# Patient Record
Sex: FEMALE | Race: BLACK OR AFRICAN AMERICAN | NOT HISPANIC OR LATINO | URBAN - METROPOLITAN AREA
[De-identification: names, ages, dates, MRNs, and addresses within clinical notes are randomized per-mention and may not be internally consistent; named-entity substitution may affect disease eponyms.]

---

## 2023-08-30 ENCOUNTER — OUTPATIENT (OUTPATIENT)
Dept: OUTPATIENT SERVICES | Facility: HOSPITAL | Age: 38
LOS: 1 days | End: 2023-08-30
Payer: MEDICAID

## 2023-08-30 DIAGNOSIS — Z3A.39 39 WEEKS GESTATION OF PREGNANCY: ICD-10-CM

## 2023-08-30 DIAGNOSIS — Z01.818 ENCOUNTER FOR OTHER PREPROCEDURAL EXAMINATION: ICD-10-CM

## 2023-08-30 LAB
ANION GAP SERPL CALC-SCNC: 9 MMOL/L — SIGNIFICANT CHANGE UP (ref 5–17)
APTT BLD: 26.2 SEC — SIGNIFICANT CHANGE UP (ref 24.5–35.6)
BLD GP AB SCN SERPL QL: SIGNIFICANT CHANGE UP
BUN SERPL-MCNC: 8 MG/DL — SIGNIFICANT CHANGE UP (ref 7–18)
CALCIUM SERPL-MCNC: 8.6 MG/DL — SIGNIFICANT CHANGE UP (ref 8.4–10.5)
CHLORIDE SERPL-SCNC: 110 MMOL/L — HIGH (ref 96–108)
CO2 SERPL-SCNC: 19 MMOL/L — LOW (ref 22–31)
CREAT SERPL-MCNC: 0.66 MG/DL — SIGNIFICANT CHANGE UP (ref 0.5–1.3)
EGFR: 117 ML/MIN/1.73M2 — SIGNIFICANT CHANGE UP
GLUCOSE SERPL-MCNC: 146 MG/DL — HIGH (ref 70–99)
HCT VFR BLD CALC: 32.1 % — LOW (ref 34.5–45)
HGB BLD-MCNC: 10.4 G/DL — LOW (ref 11.5–15.5)
INR BLD: 0.95 RATIO — SIGNIFICANT CHANGE UP (ref 0.85–1.18)
MCHC RBC-ENTMCNC: 28.4 PG — SIGNIFICANT CHANGE UP (ref 27–34)
MCHC RBC-ENTMCNC: 32.4 GM/DL — SIGNIFICANT CHANGE UP (ref 32–36)
MCV RBC AUTO: 87.7 FL — SIGNIFICANT CHANGE UP (ref 80–100)
NRBC # BLD: 0 /100 WBCS — SIGNIFICANT CHANGE UP (ref 0–0)
PLATELET # BLD AUTO: 308 K/UL — SIGNIFICANT CHANGE UP (ref 150–400)
POTASSIUM SERPL-MCNC: 3.6 MMOL/L — SIGNIFICANT CHANGE UP (ref 3.5–5.3)
POTASSIUM SERPL-SCNC: 3.6 MMOL/L — SIGNIFICANT CHANGE UP (ref 3.5–5.3)
PROTHROM AB SERPL-ACNC: 10.9 SEC — SIGNIFICANT CHANGE UP (ref 9.5–13)
RBC # BLD: 3.66 M/UL — LOW (ref 3.8–5.2)
RBC # FLD: 14.3 % — SIGNIFICANT CHANGE UP (ref 10.3–14.5)
SODIUM SERPL-SCNC: 138 MMOL/L — SIGNIFICANT CHANGE UP (ref 135–145)
T PALLIDUM AB TITR SER: NEGATIVE — SIGNIFICANT CHANGE UP
WBC # BLD: 5.52 K/UL — SIGNIFICANT CHANGE UP (ref 3.8–10.5)
WBC # FLD AUTO: 5.52 K/UL — SIGNIFICANT CHANGE UP (ref 3.8–10.5)

## 2023-09-01 ENCOUNTER — OUTPATIENT (OUTPATIENT)
Dept: OUTPATIENT SERVICES | Facility: HOSPITAL | Age: 38
LOS: 1 days | End: 2023-09-01
Payer: MEDICAID

## 2023-09-01 ENCOUNTER — INPATIENT (INPATIENT)
Facility: HOSPITAL | Age: 38
LOS: 2 days | Discharge: HOME | End: 2023-09-04
Attending: OBSTETRICS & GYNECOLOGY | Admitting: OBSTETRICS & GYNECOLOGY
Payer: COMMERCIAL

## 2023-09-01 VITALS — WEIGHT: 229.94 LBS | HEIGHT: 62 IN

## 2023-09-01 DIAGNOSIS — Z3A.39 39 WEEKS GESTATION OF PREGNANCY: ICD-10-CM

## 2023-09-01 DIAGNOSIS — Z3A.00 WEEKS OF GESTATION OF PREGNANCY NOT SPECIFIED: ICD-10-CM

## 2023-09-01 DIAGNOSIS — O26.899 OTHER SPECIFIED PREGNANCY RELATED CONDITIONS, UNSPECIFIED TRIMESTER: ICD-10-CM

## 2023-09-01 LAB
BASOPHILS # BLD AUTO: 0.04 K/UL — SIGNIFICANT CHANGE UP (ref 0–0.2)
BASOPHILS NFR BLD AUTO: 0.8 % — SIGNIFICANT CHANGE UP (ref 0–2)
BLD GP AB SCN SERPL QL: SIGNIFICANT CHANGE UP
EOSINOPHIL # BLD AUTO: 0.11 K/UL — SIGNIFICANT CHANGE UP (ref 0–0.5)
EOSINOPHIL NFR BLD AUTO: 2.1 % — SIGNIFICANT CHANGE UP (ref 0–6)
HCT VFR BLD CALC: 34.7 % — SIGNIFICANT CHANGE UP (ref 34.5–45)
HGB BLD-MCNC: 11 G/DL — LOW (ref 11.5–15.5)
HIV 1 & 2 AB SERPL IA.RAPID: REACTIVE
IMM GRANULOCYTES NFR BLD AUTO: 1.5 % — HIGH (ref 0–0.9)
LYMPHOCYTES # BLD AUTO: 1.36 K/UL — SIGNIFICANT CHANGE UP (ref 1–3.3)
LYMPHOCYTES # BLD AUTO: 26 % — SIGNIFICANT CHANGE UP (ref 13–44)
MCHC RBC-ENTMCNC: 28.4 PG — SIGNIFICANT CHANGE UP (ref 27–34)
MCHC RBC-ENTMCNC: 31.7 GM/DL — LOW (ref 32–36)
MCV RBC AUTO: 89.7 FL — SIGNIFICANT CHANGE UP (ref 80–100)
MONOCYTES # BLD AUTO: 0.54 K/UL — SIGNIFICANT CHANGE UP (ref 0–0.9)
MONOCYTES NFR BLD AUTO: 10.3 % — SIGNIFICANT CHANGE UP (ref 2–14)
NEUTROPHILS # BLD AUTO: 3.1 K/UL — SIGNIFICANT CHANGE UP (ref 1.8–7.4)
NEUTROPHILS NFR BLD AUTO: 59.3 % — SIGNIFICANT CHANGE UP (ref 43–77)
NRBC # BLD: 0 /100 WBCS — SIGNIFICANT CHANGE UP (ref 0–0)
PLATELET # BLD AUTO: 305 K/UL — SIGNIFICANT CHANGE UP (ref 150–400)
RBC # BLD: 3.87 M/UL — SIGNIFICANT CHANGE UP (ref 3.8–5.2)
RBC # FLD: 14.5 % — SIGNIFICANT CHANGE UP (ref 10.3–14.5)
T PALLIDUM AB TITR SER: NEGATIVE — SIGNIFICANT CHANGE UP
WBC # BLD: 5.23 K/UL — SIGNIFICANT CHANGE UP (ref 3.8–10.5)
WBC # FLD AUTO: 5.23 K/UL — SIGNIFICANT CHANGE UP (ref 3.8–10.5)

## 2023-09-01 PROCEDURE — G0463: CPT

## 2023-09-01 PROCEDURE — 59025 FETAL NON-STRESS TEST: CPT

## 2023-09-01 PROCEDURE — 88307 TISSUE EXAM BY PATHOLOGIST: CPT | Mod: 26

## 2023-09-01 PROCEDURE — 86923 COMPATIBILITY TEST ELECTRIC: CPT

## 2023-09-01 RX ORDER — RALTEGRAVIR 400 MG/1
400 TABLET, FILM COATED ORAL EVERY 12 HOURS
Refills: 0 | Status: DISCONTINUED | OUTPATIENT
Start: 2023-09-01 | End: 2023-09-04

## 2023-09-01 RX ORDER — ONDANSETRON 8 MG/1
4 TABLET, FILM COATED ORAL EVERY 6 HOURS
Refills: 0 | Status: DISCONTINUED | OUTPATIENT
Start: 2023-09-01 | End: 2023-09-04

## 2023-09-01 RX ORDER — OXYCODONE HYDROCHLORIDE 5 MG/1
5 TABLET ORAL
Refills: 0 | Status: COMPLETED | OUTPATIENT
Start: 2023-09-01 | End: 2023-09-08

## 2023-09-01 RX ORDER — MAGNESIUM HYDROXIDE 400 MG/1
30 TABLET, CHEWABLE ORAL
Refills: 0 | Status: DISCONTINUED | OUTPATIENT
Start: 2023-09-01 | End: 2023-09-04

## 2023-09-01 RX ORDER — DIPHENHYDRAMINE HCL 50 MG
25 CAPSULE ORAL EVERY 6 HOURS
Refills: 0 | Status: DISCONTINUED | OUTPATIENT
Start: 2023-09-01 | End: 2023-09-04

## 2023-09-01 RX ORDER — OXYTOCIN 10 UNIT/ML
333.33 VIAL (ML) INJECTION
Qty: 20 | Refills: 0 | Status: DISCONTINUED | OUTPATIENT
Start: 2023-09-01 | End: 2023-09-04

## 2023-09-01 RX ORDER — SODIUM CHLORIDE 9 MG/ML
1000 INJECTION, SOLUTION INTRAVENOUS ONCE
Refills: 0 | Status: DISCONTINUED | OUTPATIENT
Start: 2023-09-01 | End: 2023-09-01

## 2023-09-01 RX ORDER — NALOXONE HYDROCHLORIDE 4 MG/.1ML
0.1 SPRAY NASAL
Refills: 0 | Status: DISCONTINUED | OUTPATIENT
Start: 2023-09-01 | End: 2023-09-04

## 2023-09-01 RX ORDER — TETANUS TOXOID, REDUCED DIPHTHERIA TOXOID AND ACELLULAR PERTUSSIS VACCINE, ADSORBED 5; 2.5; 8; 8; 2.5 [IU]/.5ML; [IU]/.5ML; UG/.5ML; UG/.5ML; UG/.5ML
0.5 SUSPENSION INTRAMUSCULAR ONCE
Refills: 0 | Status: DISCONTINUED | OUTPATIENT
Start: 2023-09-01 | End: 2023-09-04

## 2023-09-01 RX ORDER — METOCLOPRAMIDE HCL 10 MG
10 TABLET ORAL ONCE
Refills: 0 | Status: DISCONTINUED | OUTPATIENT
Start: 2023-09-01 | End: 2023-09-04

## 2023-09-01 RX ORDER — ACETAMINOPHEN 500 MG
975 TABLET ORAL
Refills: 0 | Status: DISCONTINUED | OUTPATIENT
Start: 2023-09-01 | End: 2023-09-04

## 2023-09-01 RX ORDER — HEPARIN SODIUM 5000 [USP'U]/ML
5000 INJECTION INTRAVENOUS; SUBCUTANEOUS EVERY 8 HOURS
Refills: 0 | Status: DISCONTINUED | OUTPATIENT
Start: 2023-09-02 | End: 2023-09-04

## 2023-09-01 RX ORDER — EMTRICITABINE AND TENOFOVIR DISOPROXIL FUMARATE 200; 300 MG/1; MG/1
1 TABLET, FILM COATED ORAL DAILY
Refills: 0 | Status: DISCONTINUED | OUTPATIENT
Start: 2023-09-01 | End: 2023-09-04

## 2023-09-01 RX ORDER — SIMETHICONE 80 MG/1
80 TABLET, CHEWABLE ORAL EVERY 6 HOURS
Refills: 0 | Status: DISCONTINUED | OUTPATIENT
Start: 2023-09-01 | End: 2023-09-04

## 2023-09-01 RX ORDER — FAMOTIDINE 10 MG/ML
20 INJECTION INTRAVENOUS ONCE
Refills: 0 | Status: COMPLETED | OUTPATIENT
Start: 2023-09-01 | End: 2023-09-01

## 2023-09-01 RX ORDER — CEFAZOLIN SODIUM 1 G
2000 VIAL (EA) INJECTION ONCE
Refills: 0 | Status: COMPLETED | OUTPATIENT
Start: 2023-09-01 | End: 2023-09-01

## 2023-09-01 RX ORDER — DEXAMETHASONE 0.5 MG/5ML
4 ELIXIR ORAL EVERY 6 HOURS
Refills: 0 | Status: DISCONTINUED | OUTPATIENT
Start: 2023-09-01 | End: 2023-09-04

## 2023-09-01 RX ORDER — DIPHENHYDRAMINE HCL 50 MG
25 CAPSULE ORAL EVERY 6 HOURS
Refills: 0 | Status: COMPLETED | OUTPATIENT
Start: 2023-09-01 | End: 2024-07-30

## 2023-09-01 RX ORDER — SODIUM CHLORIDE 9 MG/ML
1000 INJECTION, SOLUTION INTRAVENOUS
Refills: 0 | Status: DISCONTINUED | OUTPATIENT
Start: 2023-09-01 | End: 2023-09-01

## 2023-09-01 RX ORDER — SODIUM CHLORIDE 9 MG/ML
1000 INJECTION, SOLUTION INTRAVENOUS
Refills: 0 | Status: DISCONTINUED | OUTPATIENT
Start: 2023-09-01 | End: 2023-09-04

## 2023-09-01 RX ORDER — SIMETHICONE 80 MG/1
80 TABLET, CHEWABLE ORAL EVERY 4 HOURS
Refills: 0 | Status: DISCONTINUED | OUTPATIENT
Start: 2023-09-01 | End: 2023-09-01

## 2023-09-01 RX ORDER — ACETAMINOPHEN 500 MG
1000 TABLET ORAL ONCE
Refills: 0 | Status: DISCONTINUED | OUTPATIENT
Start: 2023-09-01 | End: 2023-09-04

## 2023-09-01 RX ORDER — MORPHINE SULFATE 50 MG/1
0.2 CAPSULE, EXTENDED RELEASE ORAL ONCE
Refills: 0 | Status: DISCONTINUED | OUTPATIENT
Start: 2023-09-01 | End: 2023-09-04

## 2023-09-01 RX ORDER — KETOROLAC TROMETHAMINE 30 MG/ML
30 SYRINGE (ML) INJECTION EVERY 6 HOURS
Refills: 0 | Status: DISCONTINUED | OUTPATIENT
Start: 2023-09-01 | End: 2023-09-03

## 2023-09-01 RX ORDER — CITRIC ACID/SODIUM CITRATE 300-500 MG
30 SOLUTION, ORAL ORAL ONCE
Refills: 0 | Status: COMPLETED | OUTPATIENT
Start: 2023-09-01 | End: 2023-09-01

## 2023-09-01 RX ORDER — IBUPROFEN 200 MG
600 TABLET ORAL EVERY 6 HOURS
Refills: 0 | Status: COMPLETED | OUTPATIENT
Start: 2023-09-01 | End: 2024-07-30

## 2023-09-01 RX ORDER — LANOLIN
1 OINTMENT (GRAM) TOPICAL EVERY 6 HOURS
Refills: 0 | Status: DISCONTINUED | OUTPATIENT
Start: 2023-09-01 | End: 2023-09-04

## 2023-09-01 RX ORDER — FERROUS SULFATE 325(65) MG
325 TABLET ORAL DAILY
Refills: 0 | Status: DISCONTINUED | OUTPATIENT
Start: 2023-09-01 | End: 2023-09-04

## 2023-09-01 RX ADMIN — Medication 30 MILLIGRAM(S): at 18:15

## 2023-09-01 RX ADMIN — RALTEGRAVIR 400 MILLIGRAM(S): 400 TABLET, FILM COATED ORAL at 18:54

## 2023-09-01 RX ADMIN — Medication 30 MILLILITER(S): at 12:29

## 2023-09-01 RX ADMIN — Medication 25 MILLIGRAM(S): at 22:42

## 2023-09-01 RX ADMIN — EMTRICITABINE AND TENOFOVIR DISOPROXIL FUMARATE 1 TABLET(S): 200; 300 TABLET, FILM COATED ORAL at 18:55

## 2023-09-01 RX ADMIN — FAMOTIDINE 20 MILLIGRAM(S): 10 INJECTION INTRAVENOUS at 12:29

## 2023-09-01 RX ADMIN — Medication 30 MILLIGRAM(S): at 17:47

## 2023-09-01 RX ADMIN — Medication 1000 MILLIUNIT(S)/MIN: at 18:56

## 2023-09-01 RX ADMIN — Medication 100 MILLIGRAM(S): at 18:24

## 2023-09-01 NOTE — PATIENT PROFILE OB - VISION (WITH CORRECTIVE LENSES IF THE PATIENT USUALLY WEARS THEM):
What to expect if we're setting up an injection/procedure    - LIGHT Intravenous (IV) sedation is offered for some procedures: you will NOT be put to sleep.  If you plan to have sedation, do not eat or drink anything on the day of your injection.   - Most procedures require having someone drive you.  Please make sure you arrange a  unless told otherwise.   - If you take a blood thinner and you were not instructed whether to continue or hold it, please contact us with any questions.         Epidural Steroid Injection    An epidural steroid injection is a shot of steroid medicine and numbing medicine that is given into the space between the spinal cord and the bones of the back (epidural space). The shot helps relieve pain caused by an irritated or swollen nerve root.  The amount of pain relief you get from the injection depends on what is causing the nerve to be swollen and irritated, and how long your pain lasts. You are more likely to benefit from this injection if your pain is strong and comes on suddenly rather than if you have had long-term (chronic) pain.  Tell a health care provider about:  · Any allergies you have.  · All medicines you are taking, including vitamins, herbs, eye drops, creams, and over-the-counter medicines.  · Any problems you or family members have had with anesthetic medicines.  · Any blood disorders you have.  · Any surgeries you have had.  · Any medical conditions you have.  · Whether you are pregnant or may be pregnant.  What are the risks?  Generally, this is a safe procedure. However, problems may occur, including:  · Headache.  · Bleeding.  · Infection.  · Allergic reaction to medicines.  · Nerve damage.  What happens before the procedure?  Staying hydrated  Follow instructions from your health care provider about hydration, which may include:  · Up to 2 hours before the procedure - you may continue to drink clear liquids, such as water, clear fruit juice, black coffee, and  plain tea.  Eating and drinking restrictions  Follow instructions from your health care provider about eating and drinking, which may include:  · 8 hours before the procedure - stop eating heavy meals or foods, such as meat, fried foods, or fatty foods.  · 6 hours before the procedure - stop eating light meals or foods, such as toast or cereal.  · 6 hours before the procedure - stop drinking milk or drinks that contain milk.  · 2 hours before the procedure - stop drinking clear liquids.  Medicines  · You may be given medicines to lower anxiety.  · Ask your health care provider about:  ? Changing or stopping your regular medicines. This is especially important if you are taking diabetes medicines or blood thinners.  ? Taking medicines such as aspirin and ibuprofen. These medicines can thin your blood. Do not take these medicines unless your health care provider tells you to take them.  ? Taking over-the-counter medicines, vitamins, herbs, and supplements.  General instructions  · Ask your health care provider what steps will be taken to prevent infection.  · Plan to have a responsible adult take you home from the hospital or clinic.  · If you will be going home right after the procedure, plan to have a responsible adult care for you for the time you are told. This is important.  What happens during the procedure?  · An IV will be inserted into one of your veins.  · You will be given one or more of the following:  ? A medicine to help you relax (sedative).  ? A medicine to numb the area (local anesthetic).  · You will be asked to lie on your abdomen or sit.  · The injection site will be cleaned.  · A needle will be inserted through your skin into the epidural space. This may cause you some discomfort. An X-ray machine will be used to guide the needle as close as possible to the affected nerve.  · A steroid medicine and a local anesthetic will be injected into the epidural space.  · The needle and IV will be  removed.  · A bandage (dressing) will be put over the injection site.  The procedure may vary among health care providers and hospitals.  What can I expect after the procedure?  · Your blood pressure, heart rate, breathing rate, and blood oxygen level will be monitored until you leave the hospital or clinic.  · Your arm or leg may feel weak or numb for a few hours.  · The injection site may feel sore.  Follow these instructions at home:  Injection site care  · You may remove the bandage (dressing) after 24 hours.  · Check your injection site every day for signs of infection. Check for:  ? Redness, swelling, or pain.  ? Fluid or blood.  ? Warmth.  ? Pus or a bad smell.  Managing pain, stiffness, and swelling  · For 24 hours after the procedure:  ? Avoid using heat on the injection site.  ? Do not take baths, swim, or use a hot tub until your health care provider approves. Ask your health care provider if you may take showers. You may only be allowed to take sponge baths.  · If directed, put ice on the injection site. To do this:  ? Put ice in a plastic bag.  ? Place a towel between your skin and the bag.  ? Leave the ice on for 20 minutes, 2-3 times a day.    Activity  · If you were given a sedative during the procedure, it can affect you for several hours. Do not drive or operate machinery until your health care provider says that it is safe.  · Return to your normal activities as told by your health care provider. Ask your health care provider what activities are safe for you.  General instructions  · Take over-the-counter and prescription medicines only as told by your health care provider.  · Drink enough fluid to keep your urine pale yellow.  · Keep all follow-up visits as told by your health care provider. This is important.  Contact a health care provider if:  · You have any of these signs of infection:  ? Redness, swelling, or pain around your injection site.  ? Fluid or blood coming from your injection  site.  ? Warmth coming from your injection site.  ? Pus or a bad smell coming from your injection site.  ? A fever.  · You continue to have pain and soreness around the injection site, even after taking over-the-counter pain medicine.  · You have severe, sudden, or lasting nausea or vomiting.  Get help right away if:  · You have severe pain at the injection site that is not relieved by medicines.  · You develop a severe headache or a stiff neck.  · You become sensitive to light.  · You have any new numbness or weakness in your legs or arms.  · You lose control of your bladder or bowel movements.  · You have trouble breathing.  Summary  · An epidural steroid injection is a shot of steroid medicine and numbing medicine that is given into the epidural space.  · The shot helps relieve pain caused by an irritated or swollen nerve root.  · You are more likely to benefit from this injection if your pain is strong and comes on suddenly rather than if you have had chronic pain.  This information is not intended to replace advice given to you by your health care provider. Make sure you discuss any questions you have with your health care provider.  Document Revised: 04/16/2021 Document Reviewed: 06/29/2020  Elsevier Patient Education © 2021 [employer]     Normal vision: sees adequately in most situations; can see medication labels, newsprint

## 2023-09-02 LAB
BASOPHILS # BLD AUTO: 0.03 K/UL — SIGNIFICANT CHANGE UP (ref 0–0.2)
BASOPHILS NFR BLD AUTO: 0.4 % — SIGNIFICANT CHANGE UP (ref 0–2)
EOSINOPHIL # BLD AUTO: 0.1 K/UL — SIGNIFICANT CHANGE UP (ref 0–0.5)
EOSINOPHIL NFR BLD AUTO: 1.2 % — SIGNIFICANT CHANGE UP (ref 0–6)
HCT VFR BLD CALC: 25.4 % — LOW (ref 34.5–45)
HGB BLD-MCNC: 8.3 G/DL — LOW (ref 11.5–15.5)
HIV 1+2 AB+HIV1 P24 AG SERPL QL IA: REACTIVE
HIV-1 VIRAL LOAD RESULT: SIGNIFICANT CHANGE UP
HIV1 RNA # SERPL NAA+PROBE: SIGNIFICANT CHANGE UP COPIES/ML
HIV1 RNA SER-IMP: SIGNIFICANT CHANGE UP
HIV1 RNA SERPL NAA+PROBE-ACNC: SIGNIFICANT CHANGE UP
HIV1 RNA SERPL NAA+PROBE-LOG#: SIGNIFICANT CHANGE UP LG COP/ML
HIV1+2 AB SPEC QL: ABNORMAL
HIV1+2 AB SPEC QL: SIGNIFICANT CHANGE UP
IMM GRANULOCYTES NFR BLD AUTO: 0.7 % — SIGNIFICANT CHANGE UP (ref 0–0.9)
LYMPHOCYTES # BLD AUTO: 0.98 K/UL — LOW (ref 1–3.3)
LYMPHOCYTES # BLD AUTO: 12.1 % — LOW (ref 13–44)
MCHC RBC-ENTMCNC: 28.7 PG — SIGNIFICANT CHANGE UP (ref 27–34)
MCHC RBC-ENTMCNC: 32.7 GM/DL — SIGNIFICANT CHANGE UP (ref 32–36)
MCV RBC AUTO: 87.9 FL — SIGNIFICANT CHANGE UP (ref 80–100)
MONOCYTES # BLD AUTO: 0.91 K/UL — HIGH (ref 0–0.9)
MONOCYTES NFR BLD AUTO: 11.2 % — SIGNIFICANT CHANGE UP (ref 2–14)
NEUTROPHILS # BLD AUTO: 6.03 K/UL — SIGNIFICANT CHANGE UP (ref 1.8–7.4)
NEUTROPHILS NFR BLD AUTO: 74.4 % — SIGNIFICANT CHANGE UP (ref 43–77)
NRBC # BLD: 0 /100 WBCS — SIGNIFICANT CHANGE UP (ref 0–0)
PLATELET # BLD AUTO: 238 K/UL — SIGNIFICANT CHANGE UP (ref 150–400)
RBC # BLD: 2.89 M/UL — LOW (ref 3.8–5.2)
RBC # FLD: 14.1 % — SIGNIFICANT CHANGE UP (ref 10.3–14.5)
WBC # BLD: 8.11 K/UL — SIGNIFICANT CHANGE UP (ref 3.8–10.5)
WBC # FLD AUTO: 8.11 K/UL — SIGNIFICANT CHANGE UP (ref 3.8–10.5)

## 2023-09-02 RX ORDER — IBUPROFEN 200 MG
1 TABLET ORAL
Qty: 28 | Refills: 0
Start: 2023-09-02 | End: 2023-09-08

## 2023-09-02 RX ORDER — EMTRICITABINE AND TENOFOVIR DISOPROXIL FUMARATE 200; 300 MG/1; MG/1
1 TABLET, FILM COATED ORAL
Qty: 0 | Refills: 0 | DISCHARGE
Start: 2023-09-02

## 2023-09-02 RX ORDER — SENNA PLUS 8.6 MG/1
2 TABLET ORAL AT BEDTIME
Refills: 0 | Status: DISCONTINUED | OUTPATIENT
Start: 2023-09-02 | End: 2023-09-04

## 2023-09-02 RX ORDER — FERROUS SULFATE 325(65) MG
1 TABLET ORAL
Qty: 60 | Refills: 0
Start: 2023-09-02 | End: 2023-10-01

## 2023-09-02 RX ORDER — RALTEGRAVIR 400 MG/1
1 TABLET, FILM COATED ORAL
Qty: 0 | Refills: 0 | DISCHARGE
Start: 2023-09-02

## 2023-09-02 RX ORDER — DOCUSATE SODIUM 100 MG
1 CAPSULE ORAL
Qty: 40 | Refills: 0
Start: 2023-09-02 | End: 2023-09-21

## 2023-09-02 RX ORDER — ACETAMINOPHEN 500 MG
2 TABLET ORAL
Qty: 0 | Refills: 0 | DISCHARGE
Start: 2023-09-02

## 2023-09-02 RX ADMIN — SENNA PLUS 2 TABLET(S): 8.6 TABLET ORAL at 21:38

## 2023-09-02 RX ADMIN — Medication 1 TABLET(S): at 13:14

## 2023-09-02 RX ADMIN — HEPARIN SODIUM 5000 UNIT(S): 5000 INJECTION INTRAVENOUS; SUBCUTANEOUS at 03:28

## 2023-09-02 RX ADMIN — RALTEGRAVIR 400 MILLIGRAM(S): 400 TABLET, FILM COATED ORAL at 06:48

## 2023-09-02 RX ADMIN — Medication 975 MILLIGRAM(S): at 10:00

## 2023-09-02 RX ADMIN — Medication 30 MILLIGRAM(S): at 17:54

## 2023-09-02 RX ADMIN — Medication 30 MILLIGRAM(S): at 00:19

## 2023-09-02 RX ADMIN — Medication 30 MILLIGRAM(S): at 23:43

## 2023-09-02 RX ADMIN — Medication 30 MILLIGRAM(S): at 18:10

## 2023-09-02 RX ADMIN — RALTEGRAVIR 400 MILLIGRAM(S): 400 TABLET, FILM COATED ORAL at 17:54

## 2023-09-02 RX ADMIN — HEPARIN SODIUM 5000 UNIT(S): 5000 INJECTION INTRAVENOUS; SUBCUTANEOUS at 13:14

## 2023-09-02 RX ADMIN — Medication 30 MILLIGRAM(S): at 07:15

## 2023-09-02 RX ADMIN — Medication 30 MILLIGRAM(S): at 06:48

## 2023-09-02 RX ADMIN — EMTRICITABINE AND TENOFOVIR DISOPROXIL FUMARATE 1 TABLET(S): 200; 300 TABLET, FILM COATED ORAL at 13:15

## 2023-09-02 RX ADMIN — Medication 975 MILLIGRAM(S): at 22:30

## 2023-09-02 RX ADMIN — Medication 975 MILLIGRAM(S): at 21:37

## 2023-09-02 RX ADMIN — SIMETHICONE 80 MILLIGRAM(S): 80 TABLET, CHEWABLE ORAL at 06:48

## 2023-09-02 RX ADMIN — HEPARIN SODIUM 5000 UNIT(S): 5000 INJECTION INTRAVENOUS; SUBCUTANEOUS at 21:39

## 2023-09-02 RX ADMIN — SODIUM CHLORIDE 125 MILLILITER(S): 9 INJECTION, SOLUTION INTRAVENOUS at 03:33

## 2023-09-02 RX ADMIN — Medication 975 MILLIGRAM(S): at 15:16

## 2023-09-02 RX ADMIN — SIMETHICONE 80 MILLIGRAM(S): 80 TABLET, CHEWABLE ORAL at 23:42

## 2023-09-02 RX ADMIN — SIMETHICONE 80 MILLIGRAM(S): 80 TABLET, CHEWABLE ORAL at 03:06

## 2023-09-02 RX ADMIN — Medication 975 MILLIGRAM(S): at 09:06

## 2023-09-02 RX ADMIN — SIMETHICONE 80 MILLIGRAM(S): 80 TABLET, CHEWABLE ORAL at 13:14

## 2023-09-02 RX ADMIN — Medication 975 MILLIGRAM(S): at 16:15

## 2023-09-02 RX ADMIN — Medication 30 MILLIGRAM(S): at 14:15

## 2023-09-02 RX ADMIN — Medication 30 MILLIGRAM(S): at 13:15

## 2023-09-02 RX ADMIN — Medication 30 MILLIGRAM(S): at 00:45

## 2023-09-02 RX ADMIN — Medication 325 MILLIGRAM(S): at 13:14

## 2023-09-02 RX ADMIN — Medication 30 MILLIGRAM(S): at 22:30

## 2023-09-02 NOTE — DISCHARGE NOTE OB - MEDICATION SUMMARY - MEDICATIONS TO TAKE
I will START or STAY ON the medications listed below when I get home from the hospital:    ibuprofen 600 mg oral tablet  -- 1 tab(s) by mouth every 6 hours as needed for  moderate pain MDD: 7  -- Indication: For pain    Tylenol Extra Strength 500 mg oral tablet  -- 2 tab(s) by mouth every 6 hours as needed for  mild pain alternate with motrin for pain  -- Indication: For mild pain    emtricitabine-tenofovir disoproxil 200 mg-300 mg oral tablet  -- 1 tab(s) by mouth once a day  -- Indication: For meds    raltegravir 400 mg oral tablet  -- 1 tab(s) by mouth every 12 hours  -- Indication: For home meds    Prenatal Multivitamins with Folic Acid 1 mg oral tablet  -- 1 tab(s) by mouth once a day  -- Indication: For vitamins    ferrous sulfate 325 mg (65 mg elemental iron) oral tablet  -- 1 tab(s) by mouth 2 times a day MDD: 2  -- Indication: For anemia    docusate sodium 100 mg oral tablet  -- 1 tab(s) by mouth 2 times a day MDD: 2  -- Indication: For stool softener

## 2023-09-02 NOTE — DISCHARGE NOTE OB - PATIENT PORTAL LINK FT
You can access the FollowMyHealth Patient Portal offered by [facility] by registering at the following website: [email or web address]. By joining [facility]’s FollowMyHealth portal, you will also be able to view your health information using other applications (apps) compatible with our system.

## 2023-09-02 NOTE — DISCHARGE NOTE OB - NS MD DC FALL RISK RISK
For information on Fall & Injury Prevention, visit: [email or web address] OR  [email or web address] OR  https://www.cdc.gov/steadi/patient.html

## 2023-09-02 NOTE — DISCHARGE NOTE OB - CARE PROVIDER_API CALL
Ally Smith  Obstetrics and Gynecology  [address], [address]  [place] [address]  Phone: [phone]  Fax: [phone]  Established Patient  Follow Up Time: 2 weeks

## 2023-09-02 NOTE — DISCHARGE NOTE OB - PLAN OF CARE
1. Return to your regular way of eating. Resume normal activity as tolerated, however No heavy lifting or strenuous activity for 6 weeks.  No driving for next 2 weeks and/or while on narcotic pain medication.  Complete vaginal rest, no tampons, no douching, no tub bathing, no emerging in swimming pools, no sexual activities for 6 weeks unless otherwise instructed by your doctor. Call your doctor with any signs and symptoms of infection such as fever, chills, nausea or vomiting.  Call your doctor with redness or swelling at the incision site, fluid leakage or wound separation.  Call your doctor if you're unable to tolerate food or have difficulty urinating.  Call your doctor if you have pain that is not relieved by your prescribed medications.  Notify your doctor with any other concerns.  2. Please take Ibuprofen for moderate pain management. 600mg every 6 hours, as needed.  3. Please take Tylenol for mild pain management 500mg every 6 hours, as needed may take 2 tablets , max daily 4000mg only.   4. Continue prenatal vitamins for 6 weeks or throughout breastfeeding.   5. Follow-up in office in 2 weeks for wound check take iron, folic acid, vitamin C, and prenatal vitamins. eat iron fortified food continue antiviral medications and follow up with PCP/ID doctor

## 2023-09-02 NOTE — DISCHARGE NOTE OB - CARE PLAN
Assessment and plan of treatment:	1. Return to your regular way of eating. Resume normal activity as tolerated, however No heavy lifting or strenuous activity for 6 weeks.  No driving for next 2 weeks and/or while on narcotic pain medication.  Complete vaginal rest, no tampons, no douching, no tub bathing, no emerging in swimming pools, no sexual activities for 6 weeks unless otherwise instructed by your doctor. Call your doctor with any signs and symptoms of infection such as fever, chills, nausea or vomiting.  Call your doctor with redness or swelling at the incision site, fluid leakage or wound separation.  Call your doctor if you're unable to tolerate food or have difficulty urinating.  Call your doctor if you have pain that is not relieved by your prescribed medications.  Notify your doctor with any other concerns.  2. Please take Ibuprofen for moderate pain management. 600mg every 6 hours, as needed.  3. Please take Tylenol for mild pain management 500mg every 6 hours, as needed may take 2 tablets , max daily 4000mg only.   4. Continue prenatal vitamins for 6 weeks or throughout breastfeeding.   5. Follow-up in office in 2 weeks for wound check   Principal Discharge DX:	 delivery delivered  Assessment and plan of treatment:	1. Return to your regular way of eating. Resume normal activity as tolerated, however No heavy lifting or strenuous activity for 6 weeks.  No driving for next 2 weeks and/or while on narcotic pain medication.  Complete vaginal rest, no tampons, no douching, no tub bathing, no emerging in swimming pools, no sexual activities for 6 weeks unless otherwise instructed by your doctor. Call your doctor with any signs and symptoms of infection such as fever, chills, nausea or vomiting.  Call your doctor with redness or swelling at the incision site, fluid leakage or wound separation.  Call your doctor if you're unable to tolerate food or have difficulty urinating.  Call your doctor if you have pain that is not relieved by your prescribed medications.  Notify your doctor with any other concerns.  2. Please take Ibuprofen for moderate pain management. 600mg every 6 hours, as needed.  3. Please take Tylenol for mild pain management 500mg every 6 hours, as needed may take 2 tablets , max daily 4000mg only.   4. Continue prenatal vitamins for 6 weeks or throughout breastfeeding.   5. Follow-up in office in 2 weeks for wound check  Secondary Diagnosis:	Postoperative anemia due to acute blood loss  Assessment and plan of treatment:	take iron, folic acid, vitamin C, and prenatal vitamins. eat iron fortified food  Secondary Diagnosis:	HIV disease   1 Principal Discharge DX:	 delivery delivered  Assessment and plan of treatment:	1. Return to your regular way of eating. Resume normal activity as tolerated, however No heavy lifting or strenuous activity for 6 weeks.  No driving for next 2 weeks and/or while on narcotic pain medication.  Complete vaginal rest, no tampons, no douching, no tub bathing, no emerging in swimming pools, no sexual activities for 6 weeks unless otherwise instructed by your doctor. Call your doctor with any signs and symptoms of infection such as fever, chills, nausea or vomiting.  Call your doctor with redness or swelling at the incision site, fluid leakage or wound separation.  Call your doctor if you're unable to tolerate food or have difficulty urinating.  Call your doctor if you have pain that is not relieved by your prescribed medications.  Notify your doctor with any other concerns.  2. Please take Ibuprofen for moderate pain management. 600mg every 6 hours, as needed.  3. Please take Tylenol for mild pain management 500mg every 6 hours, as needed may take 2 tablets , max daily 4000mg only.   4. Continue prenatal vitamins for 6 weeks or throughout breastfeeding.   5. Follow-up in office in 2 weeks for wound check  Secondary Diagnosis:	Postoperative anemia due to acute blood loss  Assessment and plan of treatment:	take iron, folic acid, vitamin C, and prenatal vitamins. eat iron fortified food  Secondary Diagnosis:	HIV disease  Assessment and plan of treatment:	continue antiviral medications and follow up with PCP/ID doctor

## 2023-09-02 NOTE — DISCHARGE NOTE OB - HOSPITAL COURSE
Pt admitted for Elective primary  due to hx/o POS HIV with undetectable viral load.   Pt had routine post-op care and was dis charged to home. Pt admitted for Elective primary  due to hx/o POS HIV with undetectable viral load.   Pt had routine post-op care and was discharged to home.

## 2023-09-03 DIAGNOSIS — D62 ACUTE POSTHEMORRHAGIC ANEMIA: ICD-10-CM

## 2023-09-03 DIAGNOSIS — B20 HUMAN IMMUNODEFICIENCY VIRUS [HIV] DISEASE: ICD-10-CM

## 2023-09-03 LAB
BASOPHILS # BLD AUTO: 0.05 K/UL — SIGNIFICANT CHANGE UP (ref 0–0.2)
BASOPHILS NFR BLD AUTO: 0.5 % — SIGNIFICANT CHANGE UP (ref 0–2)
EOSINOPHIL # BLD AUTO: 0.22 K/UL — SIGNIFICANT CHANGE UP (ref 0–0.5)
EOSINOPHIL NFR BLD AUTO: 2.3 % — SIGNIFICANT CHANGE UP (ref 0–6)
HCT VFR BLD CALC: 23.1 % — LOW (ref 34.5–45)
HGB BLD-MCNC: 7.5 G/DL — LOW (ref 11.5–15.5)
IMM GRANULOCYTES NFR BLD AUTO: 0.6 % — SIGNIFICANT CHANGE UP (ref 0–0.9)
LYMPHOCYTES # BLD AUTO: 1.75 K/UL — SIGNIFICANT CHANGE UP (ref 1–3.3)
LYMPHOCYTES # BLD AUTO: 18.4 % — SIGNIFICANT CHANGE UP (ref 13–44)
MCHC RBC-ENTMCNC: 28.6 PG — SIGNIFICANT CHANGE UP (ref 27–34)
MCHC RBC-ENTMCNC: 32.5 GM/DL — SIGNIFICANT CHANGE UP (ref 32–36)
MCV RBC AUTO: 88.2 FL — SIGNIFICANT CHANGE UP (ref 80–100)
MONOCYTES # BLD AUTO: 1.14 K/UL — HIGH (ref 0–0.9)
MONOCYTES NFR BLD AUTO: 12 % — SIGNIFICANT CHANGE UP (ref 2–14)
NEUTROPHILS # BLD AUTO: 6.28 K/UL — SIGNIFICANT CHANGE UP (ref 1.8–7.4)
NEUTROPHILS NFR BLD AUTO: 66.2 % — SIGNIFICANT CHANGE UP (ref 43–77)
NRBC # BLD: 0 /100 WBCS — SIGNIFICANT CHANGE UP (ref 0–0)
PLATELET # BLD AUTO: 261 K/UL — SIGNIFICANT CHANGE UP (ref 150–400)
RBC # BLD: 2.62 M/UL — LOW (ref 3.8–5.2)
RBC # FLD: 14.2 % — SIGNIFICANT CHANGE UP (ref 10.3–14.5)
WBC # BLD: 9.5 K/UL — SIGNIFICANT CHANGE UP (ref 3.8–10.5)
WBC # FLD AUTO: 9.5 K/UL — SIGNIFICANT CHANGE UP (ref 3.8–10.5)

## 2023-09-03 RX ORDER — IBUPROFEN 200 MG
600 TABLET ORAL EVERY 6 HOURS
Refills: 0 | Status: DISCONTINUED | OUTPATIENT
Start: 2023-09-03 | End: 2023-09-04

## 2023-09-03 RX ADMIN — Medication 1 TABLET(S): at 13:05

## 2023-09-03 RX ADMIN — HEPARIN SODIUM 5000 UNIT(S): 5000 INJECTION INTRAVENOUS; SUBCUTANEOUS at 13:05

## 2023-09-03 RX ADMIN — RALTEGRAVIR 400 MILLIGRAM(S): 400 TABLET, FILM COATED ORAL at 18:55

## 2023-09-03 RX ADMIN — Medication 325 MILLIGRAM(S): at 13:05

## 2023-09-03 RX ADMIN — Medication 600 MILLIGRAM(S): at 14:34

## 2023-09-03 RX ADMIN — Medication 975 MILLIGRAM(S): at 03:50

## 2023-09-03 RX ADMIN — SIMETHICONE 80 MILLIGRAM(S): 80 TABLET, CHEWABLE ORAL at 13:05

## 2023-09-03 RX ADMIN — Medication 600 MILLIGRAM(S): at 19:00

## 2023-09-03 RX ADMIN — Medication 975 MILLIGRAM(S): at 22:20

## 2023-09-03 RX ADMIN — RALTEGRAVIR 400 MILLIGRAM(S): 400 TABLET, FILM COATED ORAL at 06:23

## 2023-09-03 RX ADMIN — SIMETHICONE 80 MILLIGRAM(S): 80 TABLET, CHEWABLE ORAL at 18:55

## 2023-09-03 RX ADMIN — Medication 975 MILLIGRAM(S): at 15:58

## 2023-09-03 RX ADMIN — Medication 600 MILLIGRAM(S): at 06:23

## 2023-09-03 RX ADMIN — Medication 600 MILLIGRAM(S): at 18:55

## 2023-09-03 RX ADMIN — SENNA PLUS 2 TABLET(S): 8.6 TABLET ORAL at 21:19

## 2023-09-03 RX ADMIN — HEPARIN SODIUM 5000 UNIT(S): 5000 INJECTION INTRAVENOUS; SUBCUTANEOUS at 06:43

## 2023-09-03 RX ADMIN — Medication 600 MILLIGRAM(S): at 07:15

## 2023-09-03 RX ADMIN — Medication 975 MILLIGRAM(S): at 08:35

## 2023-09-03 RX ADMIN — SIMETHICONE 80 MILLIGRAM(S): 80 TABLET, CHEWABLE ORAL at 06:23

## 2023-09-03 RX ADMIN — Medication 975 MILLIGRAM(S): at 21:18

## 2023-09-03 RX ADMIN — Medication 975 MILLIGRAM(S): at 04:50

## 2023-09-03 RX ADMIN — Medication 600 MILLIGRAM(S): at 13:05

## 2023-09-03 RX ADMIN — EMTRICITABINE AND TENOFOVIR DISOPROXIL FUMARATE 1 TABLET(S): 200; 300 TABLET, FILM COATED ORAL at 18:55

## 2023-09-03 RX ADMIN — Medication 975 MILLIGRAM(S): at 16:52

## 2023-09-03 RX ADMIN — Medication 975 MILLIGRAM(S): at 08:03

## 2023-09-03 RX ADMIN — HEPARIN SODIUM 5000 UNIT(S): 5000 INJECTION INTRAVENOUS; SUBCUTANEOUS at 21:19

## 2023-09-03 NOTE — PROGRESS NOTE ADULT - PROBLEM SELECTOR PLAN 1
Oriented - self; Oriented - place; Oriented - time
-d/c home   -instructions verbalized  -follow up in 1-2weeks in office for incision check  -d/w dr Lao

## 2023-09-04 VITALS
TEMPERATURE: 99 F | SYSTOLIC BLOOD PRESSURE: 110 MMHG | OXYGEN SATURATION: 99 % | DIASTOLIC BLOOD PRESSURE: 63 MMHG | HEART RATE: 81 BPM | RESPIRATION RATE: 18 BRPM

## 2023-09-04 PROCEDURE — 36415 COLL VENOUS BLD VENIPUNCTURE: CPT

## 2023-09-04 PROCEDURE — 59025 FETAL NON-STRESS TEST: CPT

## 2023-09-04 PROCEDURE — 86901 BLOOD TYPING SEROLOGIC RH(D): CPT

## 2023-09-04 PROCEDURE — 86850 RBC ANTIBODY SCREEN: CPT

## 2023-09-04 PROCEDURE — 86703 HIV-1/HIV-2 1 RESULT ANTBDY: CPT

## 2023-09-04 PROCEDURE — 86702 HIV-2 ANTIBODY: CPT

## 2023-09-04 PROCEDURE — 88307 TISSUE EXAM BY PATHOLOGIST: CPT

## 2023-09-04 PROCEDURE — 85025 COMPLETE CBC W/AUTO DIFF WBC: CPT

## 2023-09-04 PROCEDURE — 86780 TREPONEMA PALLIDUM: CPT

## 2023-09-04 PROCEDURE — 87536 HIV-1 QUANT&REVRSE TRNSCRPJ: CPT

## 2023-09-04 PROCEDURE — 82962 GLUCOSE BLOOD TEST: CPT

## 2023-09-04 PROCEDURE — 59050 FETAL MONITOR W/REPORT: CPT

## 2023-09-04 PROCEDURE — 87389 HIV-1 AG W/HIV-1&-2 AB AG IA: CPT

## 2023-09-04 PROCEDURE — 86900 BLOOD TYPING SEROLOGIC ABO: CPT

## 2023-09-04 RX ORDER — OXYCODONE HYDROCHLORIDE 5 MG/1
5 TABLET ORAL
Refills: 0 | Status: DISCONTINUED | OUTPATIENT
Start: 2023-09-04 | End: 2023-09-04

## 2023-09-04 RX ADMIN — SIMETHICONE 80 MILLIGRAM(S): 80 TABLET, CHEWABLE ORAL at 12:06

## 2023-09-04 RX ADMIN — Medication 600 MILLIGRAM(S): at 12:06

## 2023-09-04 RX ADMIN — Medication 600 MILLIGRAM(S): at 13:06

## 2023-09-04 RX ADMIN — Medication 325 MILLIGRAM(S): at 12:06

## 2023-09-04 RX ADMIN — OXYCODONE HYDROCHLORIDE 5 MILLIGRAM(S): 5 TABLET ORAL at 00:59

## 2023-09-04 RX ADMIN — SIMETHICONE 80 MILLIGRAM(S): 80 TABLET, CHEWABLE ORAL at 05:58

## 2023-09-04 RX ADMIN — HEPARIN SODIUM 5000 UNIT(S): 5000 INJECTION INTRAVENOUS; SUBCUTANEOUS at 12:06

## 2023-09-04 RX ADMIN — Medication 1 TABLET(S): at 12:06

## 2023-09-04 RX ADMIN — Medication 600 MILLIGRAM(S): at 06:50

## 2023-09-04 RX ADMIN — Medication 600 MILLIGRAM(S): at 01:38

## 2023-09-04 RX ADMIN — Medication 600 MILLIGRAM(S): at 00:18

## 2023-09-04 RX ADMIN — OXYCODONE HYDROCHLORIDE 5 MILLIGRAM(S): 5 TABLET ORAL at 01:45

## 2023-09-04 RX ADMIN — Medication 975 MILLIGRAM(S): at 04:00

## 2023-09-04 RX ADMIN — HEPARIN SODIUM 5000 UNIT(S): 5000 INJECTION INTRAVENOUS; SUBCUTANEOUS at 05:59

## 2023-09-04 RX ADMIN — EMTRICITABINE AND TENOFOVIR DISOPROXIL FUMARATE 1 TABLET(S): 200; 300 TABLET, FILM COATED ORAL at 12:07

## 2023-09-04 RX ADMIN — SIMETHICONE 80 MILLIGRAM(S): 80 TABLET, CHEWABLE ORAL at 00:18

## 2023-09-04 RX ADMIN — Medication 600 MILLIGRAM(S): at 05:57

## 2023-09-04 RX ADMIN — RALTEGRAVIR 400 MILLIGRAM(S): 400 TABLET, FILM COATED ORAL at 05:59

## 2023-09-04 RX ADMIN — Medication 975 MILLIGRAM(S): at 03:14

## 2023-09-04 NOTE — PROGRESS NOTE ADULT - ASSESSMENT
A/P: POD #1 s/p c/s   --Pain management as needed  -Advance as per protocol  -OOB and ambulate  -f/u Rpt CBC in am  -Aregular diet  -Encourage breastfeeding   -d/w Johnathon  
A/P: POD #3 s/p elective c/s @ 39w1d, HIV undetectable viral load on antivirals , post operative anemia due to blood loss asymptomatic, pt stable  -d/c home   -instructions verbalized  -continue iron and vitamin C   -follow up in 1-2weeks in office for incision check  -d/w Dr. Ordonez, house attending 
  A/P: POD #2 s/p primary c/s w acute blood loss anemia; asymptomatic

## 2023-09-04 NOTE — PROGRESS NOTE ADULT - SUBJECTIVE AND OBJECTIVE BOX
Patient seen at bedside resting comfortably offers no new complaints. + Ambulation, + void without difficulty, + flatus;  no bm;  tolerating regular diet. both breastfeeding and bottle feeding. Denies HA, blurry vision or epigastric pain, CP, SOB, N/V/D,  dizziness, palpitations, worsening vaginal bleeding.     Vital Signs Last 24 Hrs  T(C): 36.7 (03 Sep 2023 06:15), Max: 37 (02 Sep 2023 18:51)  T(F): 98 (03 Sep 2023 06:15), Max: 98.6 (02 Sep 2023 18:51)  HR: 98 (03 Sep 2023 06:15) (84 - 98)  BP: 117/77 (03 Sep 2023 06:15) (99/61 - 117/77)  BP(mean): --  RR: 18 (03 Sep 2023 06:15) (18 - 18)  SpO2: 97% (03 Sep 2023 06:15) (97% - 97%)    Parameters below as of 03 Sep 2023 06:15  Patient On (Oxygen Delivery Method): room air    Gen: A&O x 3, NAD  Chest: CTA B/L  Cardiac: S1,S2  RRR  Breast: Soft, nontender, nonengorged  Abdomen: +BS; soft; Nontender, nondistended, Incision C/D/I w dermabond  Gyn: Minimal lochia  Extremities: Nontender, DTRS 2+, no worsening edema                          7.5    9.50  )-----------( 261      ( 03 Sep 2023 07:36 )             23.1       
Post Op Note    Patient seen resting comfortably.  No new complaints.  Denies HA, CP, SOB, N/V/D, dizziness, palpitations, worsening abdominal pain, worsening vaginal bleeding, or any other concerns.        Vital Signs Last 24 Hrs  T(C): 36.4 (01 Sep 2023 17:15), Max: 36.4 (01 Sep 2023 17:15)  T(F): 97.5 (01 Sep 2023 17:15), Max: 97.5 (01 Sep 2023 17:15)  HR: 66 (01 Sep 2023 18:15) (64 - 70)  BP: 97/69 (01 Sep 2023 18:15) (81/43 - 102/56)  BP(mean): 77 (01 Sep 2023 18:15) (54 - 77)  RR: 17 (01 Sep 2023 17:45) (17 - 20)  SpO2: 100% (01 Sep 2023 17:45) (100% - 100%)    Parameters below as of 01 Sep 2023 17:45  Patient On (Oxygen Delivery Method): room air        Gen: A&O x 3, NAD  Chest: CTABL  Cardiac: S1+S2+ RRR  Breast: Soft, nontender, nonengorged  Abdomen: Nontender, nondistended, +BS,  Incision C/D/I with dermabond  Gyn: Minimal bleeding  Extremities: Nontender, DTRS 2+, no worsening edema, venodynes intact                          11.0   5.23  )-----------( 305      ( 01 Sep 2023 10:10 )             34.7       A/P:  Patient s/p  section, POD #0.     -Pain management as needed  -Advance as per protocol  -OOB and ambulate in am  -Repeat CBC in am   -DC church and trial of void at 12 hours  -Advance diet with flatus  
[facility] comfortably offers no new complaints. awaiting void and ambulation, + flatus; tolerating regular diet. both breast and bottle feeding. Denies HA, CP, SOB, N/V/D,  no bm; dizziness, palpitations, worsening abdominal pain, worsening vaginal bleeding, or any other concerns.     Vital Signs Last 24 Hrs  T(C): 37.1 (02 Sep 2023 09:03), Max: 37.1 (02 Sep 2023 09:03)  T(F): 98.7 (02 Sep 2023 09:03), Max: 98.7 (02 Sep 2023 09:03)  HR: 83 (02 Sep 2023 09:03) (64 - 84)  BP: 104/69 (02 Sep 2023 09:03) (81/43 - 109/62)  BP(mean): 77 (01 Sep 2023 20:08) (54 - 77)  RR: 18 (02 Sep 2023 09:03) (16 - 20)  SpO2: 97% (02 Sep 2023 09:03) (94% - 100%)    Parameters below as of 02 Sep 2023 04:35  Patient On (Oxygen Delivery Method): room air        Gen: A&O x 3, NAD  Chest: CTA B/L  Cardiac: S1,S2  RRR  Breast: Soft, nontender, nonengorged  Abdomen: +BS; soft; Nontender, nondistended; dressing removed incision C/D/I  Gyn: minimal lochia   Extremities: Nontender, no worsening edema;                           8.3    8.11  )-----------( 238      ( 02 Sep 2023 07:30 )             25.4       
Patient seen at bedside resting comfortably offers no new complaints. + Ambulation, + void without difficulty, + flatus;  no bm;  tolerating regular diet. Pt  bottle feeding. Pt denies HA, blurry vision or epigastric pain, CP, SOB, N/V/D,  dizziness, palpitations, worsening vaginal bleeding.     Vital Signs Last 24 Hrs  T(C): 37 (04 Sep 2023 05:47), Max: 37.1 (03 Sep 2023 14:33)  T(F): 98.6 (04 Sep 2023 05:47), Max: 98.8 (03 Sep 2023 14:33)  HR: 81 (04 Sep 2023 05:47) (81 - 93)  BP: 110/63 (04 Sep 2023 05:47) (101/60 - 110/63)  BP(mean): --  RR: 18 (04 Sep 2023 05:47) (18 - 18)  SpO2: 99% (04 Sep 2023 05:47) (98% - 100%)    Parameters below as of 04 Sep 2023 05:47  Patient On (Oxygen Delivery Method): room air        Gen: A&O x 3, NAD  Chest: CTA B/L  Cardiac: S1,S2  RRR  Breast: Soft, nontender, nonengorged  Abdomen: +BS; soft; Nontender, nondistended, Incision C/D/I steri strips in place   Gyn: Minimal lochia  Extremities: Nontender, no worsening edema                          7.5    9.50  )-----------( 261      ( 03 Sep 2023 07:36 )             23.1